# Patient Record
(demographics unavailable — no encounter records)

---

## 2024-10-17 NOTE — CHIEF COMPLAINT
[Follow Up Visit] : follow up visit [Patient] : patient [Mother] : mother [[employer] ] : provided by [employer]   [FreeTextEntry1] : [id number] [TWNoteComboBox1] : Vietnamese

## 2024-10-17 NOTE — ASSESSMENT
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 Thurston had noted that even endoscopy was challenging. Thus we opted to defer any catheterization while awake.   We discussed potential interventions -  She could have another vaginostomy using the pre-existing tract -- this could be done by Interventional Radiology -- prior to surgical reconstruction. The goal would be to drain hydrocolpos, to prevent painful drainage from cloacal opening. This would not, however, address the cause of the hydrocolpos.   Ultimately she will need pelvic reconstruction to separate the urethra, vagina, and rectum -- the details are specific to her anatomy and prior surgeries.  -Betsy has a long common channel and short urethra, so the likelihood that she could have urinary continence from below is very low, and she may need to continue with vesicostomy from above and have closure of bladder neck.  -The other question is in regards to vaginal reconstruction -- it is unlikely with this long CC that the vagina could be mobilized sufficiently to reach the perineum -- she would either need extensive abdominal surgery for mobilization, and/or a neovaginal graft.  In many patients with long CC, it may be best to delay vaginal reconstruction until they are older, in order to have more tissue to work with, to have tissue that is estrogenized, and when the patient is able to dilate.  In that case, we would keep the upper vagina attached to the cloacal common channel -- would need to make sure this opening is adequate for egress of hydrocolpos -- if not, would continue w/ vaginostomy tube and intermittent drainage through that opening  - In either case, the rectum would be  from the urethra and vagina, with goal of having BMs from below and reversing the colostomy.   Exam & counseling today were in conjunction with Dr. Thurston, and we made a plan to have a multidisciplinary discussion with Peds Urology as well to make a definite plan for Betsy's surgery.  No additional labs/imaging needed at this time.   TODAY 10/17/2024   Betsy is doing well after EUA -- no significant changes.  We reviewed the plan as discussed post-operatively

## 2024-10-17 NOTE — HISTORY OF PRESENT ILLNESS
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   Referred by:  RFP: VIANCA ROSE, TERRI WARNER  PCP: RICO LUCIA  Review of history: - born in [place] where she had a diverting colostomy, vesicostomy and vaginostomy drain (vaginostomy tube has since been removed and the site is closed); no appliances (ostomy bags) d/t reaction to product - established care with Dr. Thurston and Dr. VALERIE Santana in summer 2021  - has had pelvic MRI showing uterine duplication, fluid-filled genet-vaginas - normal spine MRI and cardiac echo  - 4/2022: EUA: single opening just below clitoris leading to 5 cm tortuous common channel with a < 1 cm urethra before bladder neck reached - 11/2022 pelvic US: two genet-vaginas with complex fluid (?debris)   First visit 06/21/2023: BETSY is in pre-K currently.  She urinates from vesicostomy and stools from colostomy, into diaper without appliances.  Mom shares that lately things are not going well, as there is "pus" coming out from below (from the cloacal opening). This has been going on for about 3 days. No urine coming from that opening, just pus coming out and it hurts her a lot when it happens. Happens maybe 3 times a day and has a terrible smell. No fevers. No pain in between episodes of purulent discharge.  Otherwise things are fine, no issues with voiding or BMs. No recent UTIs. Has never needed to take Miralax (old Rx on chart).   Follow-up 03/06/24: here today with mom  BETSY has started   Mom said that she had a bad odor coming from her vagina, mom thinks this has been going on for a while  Mom had taken her to the ED on 1/11/24 for urinary symptoms and pus coming from the vagina (cloacal opening) that had increased - 'They gave her an antibiotic due to a urinary infection' Started on Keflex and was told have pt f/u with Dr. Santana which she did on 3/1/24 Mom said BETSY still has pus coming out, which mom says hurts her  It occurs about 3 times/day  Mom reports no issues with BMs Currently taking Bactrim for ppx   TODAY 10/17/24: here today with mom  RN intake:  When asked why they were here today mom said she was here for a follow-up  I attempted to clarify - as no prior chart notes indicated a follow-up so soon  Mom said the discharge paperwork from the hospital advised she schedule a follow-up (after 2 weeks)   Mom said she has the pus coming from the vagina (cloacal opening) Thinks maybe it happens less frequently now (~1x per day maybe)  Mom said this is still painful for Betsy   No issues with voiding or BMs   They do not have an appt scheduled with Urology  nor any imaging appointments yet   We reviewed future plans
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
## 2024-11-01 NOTE — HISTORY OF PRESENT ILLNESS
[FreeTextEntry6] : Betsy is a 6-year-old female with a complex medical history, most notably an unrepaired cloacal malformation. She has undergone multiple surgeries, including a colostomy and vesicostomy, and requires ongoing multi-disciplinary care. Betsy is presenting today with dysuria and vaginal pain, concerning for a recurrent UTI. This visit follows a recent EUA and cystovaginoscopy on 10/1/24 performed by Dr. Anupama Lopez, which confirmed her complex anatomy and guided further surgical planning. She is currently awaiting additional reconstructive surgeries. Developmental delays, specifically with language and letter recognition, are also being monitored. Addressing the family's social determinants of health, including food insecurity and access to public benefits, remains a priority. Restarting UTI prophylaxis was addressed at her well visit on 9/13/24, but the family reports running out of Bactrim 22 days ago without notifying the team.   - Chief Complaint (CC) : Severe pain in the external vaginal region experienced during urination. Betsy has been experiencing vaginal pain for two days. The pain intensifies during urination, leading her to squat to alleviate it. She also displayed unusual behavior, such as sleeping in a squatting position. There's a previous history of urinary tract infections; however, the mother reports that previous methods for diagnosing this, such as the use of a catheter, have caused excessive pain.  - History of Present Illness : Betsy has been experiencing vaginal pain for two days. The pain intensifies during urination, leading her to squat to alleviate it. She also displayed unusual behavior, such as sleeping in a squatting position. There's a previous history of urinary tract infections; however, the mother reports that previous methods for diagnosing this, such as the use of a catheter, have caused excessive pain.  - Past Medical History : Betsy has had a previous diagnosis of a urinary tract infection.

## 2024-11-01 NOTE — PHYSICAL EXAM
[Acute Distress] : acute distress [Alert] : alert [Shai: ____] : Shai [unfilled] [Normal External Genitalia] : normal external genitalia [NL] : warm, clear [FreeTextEntry9] : urine emerging from left ostomy; beefy red ostomy on left side of abdomen with stool [FreeTextEntry6] : redness around urethra and clitoral clark

## 2024-11-01 NOTE — BEGINNING OF VISIT
[] :  [[employer] ] : provided by [employer]   [Time Spent: ____ minutes] : Total time spent using  services: [unfilled] minutes. The patient's primary language is not English thus required  services. [Mother] : mother [Interpreters_IDNumber] : [id number] [Interpreters_FullName] : Samm [TWNoteComboBox1] : Paraguayan

## 2024-11-01 NOTE — BEGINNING OF VISIT
[] :  [[employer] ] : provided by [employer]   [Time Spent: ____ minutes] : Total time spent using  services: [unfilled] minutes. The patient's primary language is not English thus required  services. [Mother] : mother [Interpreters_IDNumber] : [id number] [Interpreters_FullName] : Samm [TWNoteComboBox1] : Chilean

## 2024-12-03 NOTE — HISTORY OF PRESENT ILLNESS
[de-identified] : Painful urination  [FreeTextEntry6] : Betsy is a 6-year-old F coming in for acute visit for painful urination:   : Praveen Díaz #[id number]  Mom is noticing that she is complaining of pain when the pus is coming out Mom feels that there has been no improvement with the antibiotics She was having improvement with the antibiotics and then it just came back.  Mom feels that there pain started again once she stopped antibiotics.  Pus starts coming out about 5 to 6x per day.  No fevers. No abdominal pain or emesis.  No irritated skin that Mom notices.  She is currently on prophylaxis for UTi - daily 4.8mL daily

## 2025-01-15 NOTE — PHYSICAL EXAM
[NL] : grossly intact [TextBox_37] : Vesicostomy is draining clear urine, the double barrel stoma is pink and productive with formed stool, peristomal skin is intact without erythema, fluctuance, or induration  [TextBox_67] : Single perineal opening appreciated

## 2025-01-15 NOTE — HISTORY OF PRESENT ILLNESS
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 She currently has a vesicostomy and colostomy for urine and stool output respectively. She is known to have persistent hydrocolpos with episodic painful discharge from the cloacal opening. She underwent an EUA, cystoscopy, and vaginoscopy on 10/1/24 where she was found to have a long common channel and a short urethra. Since her last visit, Betsy has been doing well and mom notes she continues to have normal drainage from her vesicostomy and occasional drainage from her cloacal opening. No issues with the colostomy function noted either. Mom notes that they no longer are following with urology and would like to be connected to another physician.

## 2025-01-15 NOTE — HISTORY OF PRESENT ILLNESS
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 She currently has a vesicostomy and colostomy for urine and stool output respectively. She is known to have persistent hydrocolpos with episodic painful discharge from the cloacal opening. She underwent an EUA, cystoscopy, and vaginoscopy on 10/1/24 where she was found to have a long common channel and a short urethra. Since her last visit, Betsy has been doing well and mom notes she continues to have normal drainage from her vesicostomy and occasional drainage from her cloacal opening. No issues with the colostomy function noted either. Mom notes that they no longer are following with urology and would like to be connected to another physician.

## 2025-01-15 NOTE — CONSULT LETTER
[Dear  ___] : Dear  [unfilled], [Consult Letter:] : I had the pleasure of evaluating your patient, [unfilled]. [Please see my note below.] : Please see my note below. [Consult Closing:] : Thank you very much for allowing me to participate in the care of this patient.  If you have any questions, please do not hesitate to contact me. [Sincerely,] : Sincerely, [FreeTextEntry3] : Zachary Thurston MD FAAP FACS Director, [facility] Division of Pediatric General, Thoracic and Endoscopic Surgery [facility]

## 2025-01-15 NOTE — ASSESSMENT
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 She currently has a vesicostomy and colostomy for urine and stool output respectively. She is known to have persistent hydrocolpos with episodic painful discharge from the cloacal opening. She underwent an EUA, cystoscopy, and vaginoscopy on 10/1/24 where she was found to have a long common channel and a short urethra. She is doing well overall and has adequate drainage from the vesicostomy and a normal output from her colostomy at home. I counseled mom and reassured her that our focus is to devise an optimal treatment plan to allow Betsy to live comfortably with continence over her urination and bowel movements moving forward. I reviewed her anatomy with mom and carlos pictures illustrating the anatomy for mom's understanding. I then explained that the plan for surgery requires multiple surgery specialties to complete, but I discussed my GI portion of the surgery to target her bowel function and addressed the need to further understand the urological component to the surgery. Thus, I provided mom with the contact information required to schedule an appointment with Dr. Gitlin of the urology team for further evaluation. After their visit, I will connect with Dr. Gitlin to discuss Betsy's case and the plan for surgery further. Mom verbalized her understanding and agreed to follow up with urology. We will be in touch. Mom has my information and knows to contact me sooner with any questions or concerns.

## 2025-01-15 NOTE — REASON FOR VISIT
[Follow-up - Scheduled] : a follow-up, scheduled visit for [Other: ____] : [unfilled] [Patient] : patient [Mother] : mother [Interpreters_IDNumber] : [id number] [Interpreters_FullName] : Karel [TWNoteComboBox1] : [place]

## 2025-01-15 NOTE — REASON FOR VISIT
[Follow-up - Scheduled] : a follow-up, scheduled visit for [Other: ____] : [unfilled] [Patient] : patient [Mother] : mother [Interpreters_IDNumber] : [id number] [Interpreters_FullName] : Karel [TWNoteComboBox1] : Canadian

## 2025-01-15 NOTE — ADDENDUM
[FreeTextEntry1] : Documented by Zeus Mehta acting as a scribe for Dr. Thurston on 01/15/2025.   All medical record entries made by the Scribe were at my, Dr. Thurston, direction and personally dictated by me on 01/15/2025. I have reviewed the chart and agree that the record accurately reflects my personal performances of the history, physical exam, assessment and plan. I have also personally directed, reviewed, and agree with the instructions.

## 2025-01-15 NOTE — CONSULT LETTER
[Dear  ___] : Dear  [unfilled], [Consult Letter:] : I had the pleasure of evaluating your patient, [unfilled]. [Please see my note below.] : Please see my note below. [Consult Closing:] : Thank you very much for allowing me to participate in the care of this patient.  If you have any questions, please do not hesitate to contact me. [Sincerely,] : Sincerely, [FreeTextEntry3] : Zachary Thurston MD FAAP FACS Director, [facility] Division of Pediatric General, Thoracic and Endoscopic Surgery [facility]

## 2025-02-20 NOTE — DISCUSSION/SUMMARY
[FreeTextEntry1] : Betsy is a 7yo x/ hx of cloacal malformation s/p vesicostomy and colostomy as well as persistent hydrocolpos presenting with tactile fever, vomiting, and body ache at home. Physical exam reassuring given clear BS on auscultation as well as appropriate respiratory rate and effort. Patient does not appear dry on exam with moist mucus membranes and cap refill <2 seconds. Given myalgias, tactile fevers, and vomiting, will test for Flu with the Flu/COVID/RSV swab. Given her complex hx and CVA tenderness on exam, will rule out UTI by sending UCx  Plan - Will obtain Flu/COVID/RSV swab - Will obtain urine for UCx - gave strict ED precautions regarding dehydration or development of difficulty breathing

## 2025-02-20 NOTE — HISTORY OF PRESENT ILLNESS
[FreeTextEntry6] : Betsy is a 5yo x/ hx of cloacal malformation s/p vesicostomy and colostomy as well as persistent hydrocolpos presenting with tactile fever, vomiting, and body ache at home. Symptoms started yesterday when patient felt warm and starting vomiting with intake of solid foods. Symptoms continued today with x3 more episodes of NBNB emesis so family presented. Mom not recording temps at home, last tylenol at 6am. Patient is tolerating water with x3 voids today. Mom denies diarrhea, constipation, cough, or congestion. Mom does endorse patient is complaining of body aches. No sick contacts. VUTD including flu vaccine. She takes bactrim ppx qD to prevent UTIs

## 2025-02-20 NOTE — PHYSICAL EXAM
[Soft] : soft [NL] : warm, clear [Tender] : nontender [Distended] : nondistended [Normal Bowel Sounds] : abnormal bowel sounds [Hepatosplenomegaly] : no hepatosplenomegaly [FreeTextEntry9] : +CVA tenderness

## 2025-02-20 NOTE — REVIEW OF SYSTEMS
[Fever] : fever [Malaise] : malaise [Appetite Changes] : appetite changes [Vomiting] : vomiting [Myalgia] : myalgia [Negative] : Genitourinary [Chills] : no chills [Difficulty with Sleep] : no difficulty with sleep [Intolerance to feeds] : tolerance to feeds [Diarrhea] : no diarrhea [Constipation] : no constipation [Gaseous] : not gaseous [Abdominal Pain] : no abdominal pain [Restriction of Motion] : no restriction of motion [Bone Deformity] : no bone deformity [Swelling of Joint] : no swelling of joint [Redness of Joint] : no redness of joint

## 2025-03-10 NOTE — PHYSICAL EXAM
[NL] : grossly intact [TextBox_37] : Mild lower abdominal tenderness, foul-smelling urine from the vesicostomy, normal stool from the colostomy

## 2025-03-10 NOTE — HISTORY OF PRESENT ILLNESS
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 She currently has a vesicostomy and col[place]. She is known to have persistent hydrocolpos with episodic painful discharge from the cloacal opening. She underwent an EUA, cystoscopy and vaginoscopy on 10/1/24 where she was found to have a long common channel and a short urethra. Since her last visit, Mom trudi Meyer has developed N/V and abdominal pain over the last few days. Her appetite has decreased and she also has a decreased urinary output, which mom notes has been ongoing.

## 2025-03-10 NOTE — REASON FOR VISIT
[Follow-up - Scheduled] : a follow-up, scheduled visit for [Other: ____] : [unfilled] [Patient] : patient [Mother] : mother [Interpreters_IDNumber] : [id number]  [Interpreters_FullName] : Allison [TWNoteComboBox1] : [place] citizen

## 2025-03-10 NOTE — CONSULT LETTER
[Dear  ___] : Dear  [unfilled], [Consult Letter:] : I had the pleasure of evaluating your patient, [unfilled]. [Please see my note below.] : Please see my note below. [Consult Closing:] : Thank you very much for allowing me to participate in the care of this patient.  If you have any questions, please do not hesitate to contact me. [Sincerely,] : Sincerely, [FreeTextEntry3] : Zachary Thurston MD FAAP FACS Director, [facility] Division of Pediatric General, Thoracic and Endoscopic Surgery [facility]

## 2025-03-10 NOTE — HISTORY OF PRESENT ILLNESS
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 She currently has a vesicostomy and col[place]. She is known to have persistent hydrocolpos with episodic painful discharge from the cloacal opening. She underwent an EUA, cystoscopy and vaginoscopy on 10/1/24 where she was found to have a long common channel and a short urethra. Since her last visit, Mom trudi Meyer has developed N/V and abdominal pain over the last few days. Her appetite has decreased and she also has a decreased urinary output, which mom notes has been ongoing.

## 2025-03-10 NOTE — REASON FOR VISIT
[Follow-up - Scheduled] : a follow-up, scheduled visit for [Other: ____] : [unfilled] [Patient] : patient [Mother] : mother [Interpreters_IDNumber] : [id number]  [Interpreters_FullName] : Allison [TWNoteComboBox1] : Sen[place]alese

## 2025-03-10 NOTE — ADDENDUM
[FreeTextEntry1] : Documented by Zeus Mehta acting as a scribe for Dr. Thurston on 03/10/2025.   All medical record entries made by the Scribe were at my, Dr. Thurston, direction and personally dictated by me on 03/10/2025. I have reviewed the chart and agree that the record accurately reflects my personal performances of the history, physical exam, assessment and plan. I have also personally directed, reviewed, and agree with the instructions.

## 2025-03-10 NOTE — CONSULT LETTER
[Dear  ___] : Dear  [unfilled], [Consult Letter:] : I had the pleasure of evaluating your patient, [unfilled]. [Please see my note below.] : Please see my note below. [Consult Closing:] : Thank you very much for allowing me to participate in the care of this patient.  If you have any questions, please do not hesitate to contact me. [Sincerely,] : Sincerely, [FreeTextEntry3] : Zachary Thurston MD FAAP FACS Director, [facility] Division of Pediatric General, Thoracic and Endoscopic Surgery [facility]

## 2025-03-10 NOTE — ASSESSMENT
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 She currently has a vesicostomy and colostomy for urine and stool output respectively. Most recently, Betsy has developed concerning symptoms including a decreased appetite and urinary output, along with abdominal pain and N/V. I counseled mom and explained to her that given these symptoms, I recommend the family presents to the emergency room today to complete a formal workup of any possible UTI, virus, or other abnormality. Mom verbalized her understanding and agreed to present to the emergency room after our visit. Moving forward, I will continue to discuss Betsy's case with  to finalize a treatment plan to address her cloacal malformation. Mom has my information and knows to contact me sooner with any questions or concerns.

## 2025-03-10 NOTE — ASSESSMENT
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 She currently has a vesicostomy and colostomy for urine and stool output respectively. Most recently, Betsy has developed concerning symptoms including a decreased appetite and urinary output, along with abdominal pain and N/V. I counseled mom and explained to her that given these symptoms, I recommend the family presents to the emergency room today to complete a formal workup of any possible UTI, virus, or other abnormality. Mom verbalized her understanding and agreed to present to the emergency room after our visit. Moving forward, I will continue to discuss Betsy's case with  to finalize a treatment plan to address her cloacal malformation. Mom has my information and knows to contact me sooner with any questions or concerns.

## 2025-03-19 NOTE — PHYSICAL EXAM
[Alert] : alert [FROM] : full passive range of motion [Clear to Auscultation Bilaterally] : clear to auscultation bilaterally [Regular Rate and Rhythm] : regular rate and rhythm [Normal S1, S2 audible] : normal S1, S2 audible [Soft] : soft [Normal Bowel Sounds] : normal bowel sounds [Normal External Genitalia] : normal external genitalia [Moves All Extremities x 4] : moves all extremities x4 [Warm] : warm [Dry] : dry [Acute Distress] : no acute distress [Lethargic] : not lethargic [Tenderness] : no tenderness [Swelling] : no swelling [Wheezing] : no wheezing [Rales] : no rales [Crackles] : no crackles [Murmur] : no murmur [Tender] : nontender [Distended] : nondistended [FreeTextEntry9] : Ostomy in place pink and healthy tissue noted

## 2025-03-19 NOTE — HISTORY OF PRESENT ILLNESS
[de-identified] : Hospital Visit for gastroenteritis and suspected UTI [FreeTextEntry6] : Spoke to [facility] using  [facility] ID [id number]  Betsy is a 5 yo girl w/ PMH of cloacal malformation s/p colostomy and vesicostomy, recurrent UTI, uterine didelphys, VUR, L-sided hydronephrosis, distended vagina, and hydrocolpos p/w NBNB emesis and abd pain x3 days who had  NBNB emesis and abd pain x3 days found to have UA in the ER that was obtained via clean catch and straight cath, both of which showed turbid urine w/ positive leukocyte esterase. UCx grew >3 specims likely a contaminant but was continued on IV CTX until 3/12, and d/c with additional 3 days of Keflex to complete treatment course for UTI. Urology team consulted in the hospital who was not recommending any intervention at this time. Also consulted nephrology team iso high BPs in 120s/80s and concern for chronic kidney disease and recommended isradipine prn for BP >120/85 and required 1 prn during admission. She also had RBUS showing dilated L kidney with significant hydronephrosis.   Per [facility], Betsy is back to baseline from hospitalization and [facility] has no concerns at this time. She is eating and drinking well without any vomiting or diarrhea. She has no fevers, cough or congestion. Betsy denies any abdominal pain. [facility] reports she is seeing her urologist today at [facility] and will see the nephrologists next week. She finished her 3 days of Keflex and is back to taking her Bactrim daily.

## 2025-03-19 NOTE — DISCUSSION/SUMMARY
[FreeTextEntry1] : Betsy is a 5 yo girl w/ PMH of cloacal malformation s/p colostomy and vesicostomy, recurrent UTI, uterine didelphys, VUR, L-sided hydronephrosis, distended vagina, and hydrocolpos  who was admitted to the hospital for suspected UTI and found to be Rhino/entero and Coronavirus + started and ceftriaxone and received 3 days keflex whose course was complicated by elevated BP's and need for nephrology follow up is now doing well. Patient will see urology today at [facility] and has nephrology f/u 3/27. At this time will send new refill for bactrim and have MOC call with any other concerns.

## 2025-03-19 NOTE — DISCUSSION/SUMMARY
[FreeTextEntry1] : Betsy is a 7 yo girl w/ PMH of cloacal malformation s/p colostomy and vesicostomy, recurrent UTI, uterine didelphys, VUR, L-sided hydronephrosis, distended vagina, and hydrocolpos  who was admitted to the hospital for suspected UTI and found to be Rhino/entero and Coronavirus + started and ceftriaxone and received 3 days keflex whose course was complicated by elevated BP's and need for nephrology follow up is now doing well. Patient will see urology today at [facility] and has nephrology f/u 3/27. At this time will send new refill for bactrim and have MOC call with any other concerns.

## 2025-03-19 NOTE — REVIEW OF SYSTEMS
[Fever] : no fever [Malaise] : no malaise [Chest Pain] : no chest pain [Cough] : no cough [Shortness of Breath] : no shortness of breath [Appetite Changes] : no appetite changes [Intolerance to feeds] : tolerance to feeds [Vomiting] : no vomiting [Diarrhea] : no diarrhea

## 2025-03-19 NOTE — HISTORY OF PRESENT ILLNESS
[de-identified] : Hospital Visit for gastroenteritis and suspected UTI [FreeTextEntry6] : Spoke to [facility] using  [facility]eria ID [id number]  Betsy is a 5 yo girl w/ PMH of cloacal malformation s/p colostomy and vesicostomy, recurrent UTI, uterine didelphys, VUR, L-sided hydronephrosis, distended vagina, and hydrocolpos p/w NBNB emesis and abd pain x3 days who had  NBNB emesis and abd pain x3 days found to have UA in the ER that was obtained via clean catch and straight cath, both of which showed turbid urine w/ positive leukocyte esterase. UCx grew >3 specims likely a contaminant but was continued on IV CTX until 3/12, and d/c with additional 3 days of Keflex to complete treatment course for UTI. Urology team consulted in the hospital who was not recommending any intervention at this time. Also consulted nephrology team iso high BPs in 120s/80s and concern for chronic kidney disease and recommended isradipine prn for BP >120/85 and required 1 prn during admission. She also had RBUS showing dilated L kidney with significant hydronephrosis.   Per [facility], Betsy is back to baseline from hospitalization and [facility] has no concerns at this time. She is eating and drinking well without any vomiting or diarrhea. She has no fevers, cough or congestion. Betsy denies any abdominal pain. [facility] reports she is seeing her urologist today at [facility] and will see the nephrologists next week. She finished her 3 days of Keflex and is back to taking her Bactrim daily.

## 2025-03-31 NOTE — HISTORY OF PRESENT ILLNESS
[FreeTextEntry1] : Betsy is a 7yo female with a history of cloacal malformation with a colostomy and vesicostomy in place. 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 On her most recent EUA, she was found to have a long common channel and a short urethra. She has had recurrent episodes of urinary tract infections.

## 2025-03-31 NOTE — CONSULT LETTER
[Dear  ___] : Dear  [unfilled], [Consult Letter:] : I had the pleasure of evaluating your patient, [unfilled]. [Please see my note below.] : Please see my note below. [Consult Closing:] : Thank you very much for allowing me to participate in the care of this patient.  If you have any questions, please do not hesitate to contact me. [Sincerely,] : Sincerely, [FreeTextEntry3] : Zachary Thurston MD FAAP FACS Director, [facility] Division of Pediatric General, Thoracic and Endoscopic Surgery [facility]

## 2025-04-09 NOTE — CONSULT LETTER
[FreeTextEntry3] : Zachary Thurston MD FAAP FACS Director, [facility] Division of Pediatric General, Thoracic and Endoscopic Surgery [facility]

## 2025-04-09 NOTE — ADDENDUM
[FreeTextEntry1] : Documented by Zeus Mehta acting as a scribe for Dr. Thurston on 04/09/2025.   All medical record entries made by the Scribe were at my, Dr. Thurston, direction and personally dictated by me on 04/09/2025. I have reviewed the chart and agree that the record accurately reflects my personal performances of the history, physical exam, assessment and plan. I have also personally directed, reviewed, and agree with the instructions.

## 2025-04-09 NOTE — HISTORY OF PRESENT ILLNESS
[FreeTextEntry1] : Betsy is a 7yo female with a history of cloacal malformation with a colostomy and vesicostomy in place. 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 On her most recent EUA, she was found to have a long common channel and a short urethra. She has had recurrent episodes of urinary tract infections. Most recently, Betsy continues to have pain at the vesicostomy site when urinating. Mom notes she has been treating with Noy-LAX at home, which has improved the output from her colostomy, but after discontinuing the Noy-LAX yesterday, her stool has become harder. Mom has not been keeping the appliance over the stoma site as she feels it is too difficult to maintain with the urinary output from her vesicostomy. Betsy is eating well without emesis. No fevers or changes in energy levels noted. To note, mom has not yet been able to schedule an appointment with Dr. Gitlin of the Urology team.

## 2025-04-09 NOTE — CONSULT LETTER
[FreeTextEntry3] : Zachary Thurston MD FAAP FACS Director, [facility] Division of Pediatric General, Thoracic and Endoscopic Surgery [facility]

## 2025-04-09 NOTE — ASSESSMENT
[FreeTextEntry1] : Betsy is a 5yo female with a history of cloacal malformation with a colostomy and vesicostomy in place. She continues to experience pain at the vesicostomy when urinating and her colostomy output improved with the Noy-LAX treatment, but after discontinuing yesterday, the stool has become harder. I counseled mom and began by explaining to her that one way to maintain the vesicostomy and colostomy sites would be to add an appliance to both areas to control the leakage and output of stool. However, I recommended we move forward with the plan to complete a cystovaginoscopy in the OR in accordance with Dr. Gitlin to evaluate her anatomy in order to formulate a plan for reconstructive surgery in the near future. I will also prescribe a course of Keflex for the family to remain compliant with over the next few weeks. Lastly, I advised mom to remain compliant with a capful of Noy-LAX daily to aid with the consistency of her stool output. Mom demonstrated appropriate understanding and is in agreement with this plan. We will facilitate a date for the OR in the near future and I will reach out to mom with an update. She has my information and knows to contact me sooner with any questions or concerns.

## 2025-04-09 NOTE — ASSESSMENT
[FreeTextEntry1] : Betsy is a 7yo female with a history of cloacal malformation with a colostomy and vesicostomy in place. She continues to experience pain at the vesicostomy when urinating and her colostomy output improved with the Noy-LAX treatment, but after discontinuing yesterday, the stool has become harder. I counseled mom and began by explaining to her that one way to maintain the vesicostomy and colostomy sites would be to add an appliance to both areas to control the leakage and output of stool. However, I recommended we move forward with the plan to complete a cystovaginoscopy in the OR in accordance with Dr. Gitlin to evaluate her anatomy in order to formulate a plan for reconstructive surgery in the near future. I will also prescribe a course of Keflex for the family to remain compliant with over the next few weeks. Lastly, I advised mom to remain compliant with a capful of Noy-LAX daily to aid with the consistency of her stool output. Mom demonstrated appropriate understanding and is in agreement with this plan. We will facilitate a date for the OR in the near future and I will reach out to mom with an update. She has my information and knows to contact me sooner with any questions or concerns.

## 2025-04-11 NOTE — HISTORY OF PRESENT ILLNESS
[de-identified] : Hospital Follow Up  [FreeTextEntry6] : Patient is a 6 year old female with hx of cloacal malformation s/p colostomy and vesicostomy as well as uterine didelphys, L hydronephrosis, and VUR with recurrent UTIs presenting for hospital follow up. Patient presented to the ED on 3/21 with fevers and flank pain, was found to have pyelonephritis with a urine culture that grew >100,000 e. coli. Patient was treated with ceftriaxone then transitioned to PO kelfex (which infection was sensitive to in vitro). During admission patient was also found to have large stool burden on CT abd/pelvis and patient was brought to OR with surgical team for fecal disimpaction. She was discharged on keflex and miralax which she has been taking. Having regular, soft stool output. She finished keflex course yesterday and resumed home bactrim UTI ppx today. Patient and parent endorse that since this hospitalization patient has continued to have perineal pain and scant dark-colored, foul-smelling discharge from the perineal opening. No pain surrounding colostomy or vesicostomy site. No other abdominal or back pain. No fevers. Tolerating PO.

## 2025-04-11 NOTE — BEGINNING OF VISIT
[Patient] : patient [] :  [Language Line ] : provided by Language Line   [Mother] : mother [Interpreters_IDNumber] : [id number]

## 2025-04-11 NOTE — END OF VISIT
[] : Resident [FreeTextEntry3] : 7 y/o F with cloacal malformation with colostomy and vesicostomy here for HFU. Admitted 3/22-3/28 with fevers and flank pain. Had E. Coli UTI Went to the OR for fecal disimpaction. Since 3/28 was d/c'ed home on miralax and to complete cephalexin course. Has had normal soft stool output. Since d/c has not had further fevers. Reports pain at the perineal opening. Unable to collect urine from vesicostomy at the time of visit. Has f/u with Surgery today. Reviewed urine culture and sensitivies from the hospital, showing E. Coli resistant to Bactrim. Will need to discuss with GI regarding changing Bactrim prophylaxis.

## 2025-04-11 NOTE — DISCUSSION/SUMMARY
[FreeTextEntry1] : 6 year old female with hx of cloacal malformation s/p vesicostomy and colostomy with single perineal opening connecting to urethra, vaginal canals (2), and rectum (per review of exam under anesthesia notes) presenting for hospital follow up. While fevers and flank pain resolved following inpatient/home keflex treatment, patient continues to have discomfort at the perineal opening with associated foul-smelling discharge. On exam, no overlying skin changes or obvious signs of infection. MOC comfortable with obtaining catheterized sample through vesicostomy opening which we will send for urinalysis and culture. Patient to see Dr. Thurston of generally surgery this afternoon, will discuss further evaluation of perineal discomfort with that team. Outside of current evaluation of possibly persistent UTI, patient will require alternative UTI ppx given the e coli found during this admission was not sensitive to bactrim. Will discuss this/ongoing care plan with urology.   - UA/Ucx unable to be collected today - supplies sent home with MOC  - Care coordination with surgical team for further evaluation of perineal discomfort - Appt with Dr. Thurston 1 PM today  - Coordination with urologic team for ongoing antibiotic prophylaxis

## 2025-04-11 NOTE — REVIEW OF SYSTEMS
[Negative] : Heme/Lymph [Fever] : no fever [Intolerance to feeds] : tolerance to feeds [Abdominal Pain] : no abdominal pain

## 2025-04-11 NOTE — HISTORY OF PRESENT ILLNESS
[de-identified] : Hospital Follow Up  [FreeTextEntry6] : Patient is a 6 year old female with hx of cloacal malformation s/p colostomy and vesicostomy as well as uterine didelphys, L hydronephrosis, and VUR with recurrent UTIs presenting for hospital follow up. Patient presented to the ED on 3/21 with fevers and flank pain, was found to have pyelonephritis with a urine culture that grew >100,000 e. coli. Patient was treated with ceftriaxone then transitioned to PO kelfex (which infection was sensitive to in vitro). During admission patient was also found to have large stool burden on CT abd/pelvis and patient was brought to OR with surgical team for fecal disimpaction. She was discharged on keflex and miralax which she has been taking. Having regular, soft stool output. She finished keflex course yesterday and resumed home bactrim UTI ppx today. Patient and parent endorse that since this hospitalization patient has continued to have perineal pain and scant dark-colored, foul-smelling discharge from the perineal opening. No pain surrounding colostomy or vesicostomy site. No other abdominal or back pain. No fevers. Tolerating PO.

## 2025-04-11 NOTE — BEGINNING OF VISIT
[Patient] : patient [] :  [Language Line ] : provided by Language Line   [Mother] : mother [Interpreters_IDNumber] : [id number]

## 2025-04-11 NOTE — END OF VISIT
[] : Resident [FreeTextEntry3] : 5 y/o F with cloacal malformation with colostomy and vesicostomy here for HFU. Admitted 3/22-3/28 with fevers and flank pain. Had E. Coli UTI Went to the OR for fecal disimpaction. Since 3/28 was d/c'ed home on miralax and to complete cephalexin course. Has had normal soft stool output. Since d/c has not had further fevers. Reports pain at the perineal opening. Unable to collect urine from vesicostomy at the time of visit. Has f/u with Surgery today. Reviewed urine culture and sensitivies from the hospital, showing E. Coli resistant to Bactrim. Will need to discuss with GI regarding changing Bactrim prophylaxis.

## 2025-04-11 NOTE — PHYSICAL EXAM
[Alert] : alert [EOMI] : grossly EOMI [Pink Nasal Mucosa] : pink nasal mucosa [Supple] : supple [FROM] : full passive range of motion [Clear to Auscultation Bilaterally] : clear to auscultation bilaterally [Regular Rate and Rhythm] : regular rate and rhythm [Normal S1, S2 audible] : normal S1, S2 audible [Soft] : soft [No Abnormal Lymph Nodes Palpated] : no abnormal lymph nodes palpated [Moves All Extremities x 4] : moves all extremities x4 [Warm, Well Perfused x4] : warm, well perfused x4 [Warm] : warm [Clear] : clear [Dry] : dry [Acute Distress] : no acute distress [Toxic] : not toxic [Traumatic] : atraumatic [Erythematous Oropharynx] : nonerythematous oropharynx [Tender] : nontender [Distended] : nondistended [FreeTextEntry9] : Vesicostomy and colostomy opening directly into diaper. No erythema or irritation surrounding opening sites. No CVA tenderness.  [FreeTextEntry6] : Single, small perineal opening inferior to the clitoris. Scant discharge appreciated. TTP in the region of the opening. No overlying skin changes.

## 2025-04-24 NOTE — HISTORY OF PRESENT ILLNESS
[FreeTextEntry6] : abdominal pain subsided completed levaquin, unsure if keflex is needed to continue has been afebrile    Hospital Course: Discharge Date 15-Apr-2025 Admission Date 10-Apr-2025 22:42 Reason for Admission Abdominal pain and constipation Hospital Course BETSY ESPARZA is a 6y8m Female who was admitted to [facility] for abdominal pain  Betsy is a 7 yo girl w/ PMH of cloacal malformation s/p colostomy and vesicostomy, recurrent UTI, uterine didelphys, VUR, L-sided hydronephrosis, uterine didelphys distended vagina, and hydrocolpos presents to the ER with complaints of left flank pain and fevers x1d. She was seen in the clinic 1d prior to admission was instructed to take miralax daily and initiated on keflex. Per mother she has been taking the Keflex since but she had worsening pain and fevers. She denies emesis, her ostomy continues to have output, vesicostomy continues to drain urine. She was recently admitted on 03/22/2025 for similar complaints and required an examination under anesthesia and fecal disimpaction on 03/26/2025. In ER, Labs show a white count of 9.5k, urine 2-5 wbcs, no nitrites and few bacteria X ray abdomen shows Large R colonic stool burden and non obstructive gas pattern She was admitted to pediatric surgery for bowel regimen and workup. On 4/11, urology was consulted for UTI workup, sent a sterile culture from her vesicostomy. Culture significant for multiple organisms including E. coli, pseudomonas, enterococcus. ID was consulted and recommended 1 week of levaquin for treatment. She drank golytely until her stool output via stoma improved.   At time of discharge, pt was tolerating a regular diet, voiding/stooling independently, ambulating, and pain was well-controlled. Patient and family felt ready for discharge.  Med Reconciliation: Medication Reconciliation Status Admission Reconciliation is Not Complete Discharge Reconciliation is Completed lovofloxacin starting keflex today per [facility]  Discharge Medications acetaminophen 160 mg/5 mL oral suspension: 7.5 milliliter(s) orally every 6 hours as needed for mild pain Keflex 250 mg/5 mL oral liquid: 6 milliliter(s) orally once a day Starting 4/22 levoFLOXacin 25 mg/mL oral solution: 8 milliliter(s) orally once a day through 4/21 polyethylene glycol 3350 oral powder for reconstitution: 17 gram(s) orally 2 times a day  Care Plan/Procedures: Discharge Diagnoses, Assessment and Plan of Treatment PRINCIPAL DISCHARGE DIAGNOSIS Diagnosis: Acute UTI (urinary tract infection) Assessment and Plan of Treatment: Goal(s) To get better and follow your care plan as instructed. Follow Up: Care Providers for Follow up (PCP/Outpatient Provider) [facility] [address] [place] [address], [address] Entrance [address] [place] [id number] Phone: [phone] Fax: [phone] Follow Up Time: 2 weeks Additional Provider Info (For SysAdmin Use Only) PROVIDER:[TOKEN:[[id number]],FOLLOWUP:[2 weeks]] Care Providers Direct Addresses (For SYSAdmin Use Only) ,[email or web address] NPI number (For SysAdmin Use Only) : [[id number]] Patient's Scheduled Appointments Bridger Hickman [facility] PED[facility] [address] [place] [address] Scheduled Appointment: 04/22/2025  Flaquita Marie [facility] PEDUROLOGY [address] [place] [address] Scheduled Appointment: 06/02/2025 Additional Scheduled Appointments APPTS ARE READY TO BE MADE: [X ] YES  Additional Information about above appointments (if needed): [X] Can be seen by an ACP on a day that their surgeon is in clinic  Type of Appt: [ ] RPA [X] HFU [ ] POA  Best Family or Patient Contact (if needed): Discharge Diet Regular Diet - No restrictions Activity Follow Instructions Provided by your Surgical Team Additional Instructions PAIN: You may continue to take Acetaminophen (Tylenol) and Ibuprofen (Advil, Motrin) over the counter for pain or fever as needed. ACTIVITY: Return to normal activity level as tolerated. NOTIFY YOUR PEDIATRICIAN FOR: Any fever (over 100.5 F) or his/her pain is not controlled on their discharge pain medications, any new or worsening non-emergency symptoms. RETURN TO ED: If any change in mental status (drowsy, non-responsive), persistent vomiting ANTIBIOTICS: Please complete your course of antibiotics as prescribed. Please call our office if you notice new or worsening diarrhea, or inability to tolerate the oral medications. Quality Measures: Does the patient have difficulty climbing stairs? No Cognition: The patient has No difficulties Patient Condition Stable Hospice Patient No Core Measure Site No Document Complete: Care Provider Seen in Hospital [facility] Phys[facility] Section Complete This document is complete and the patient is ready for discharge. For questions about your prescriptions, please call: [phone] Is this contact telephone number correct? Yes Attending Attestation Statement I have personally seen and examined the patient. I have collaborated with and supervised the . on the discharge service for the patient. I have reviewed and made amendments to the documentation where necessary.   Electronic Signatures: Nida Garcia) (Signed 15-Apr-2025 11:07) Co-Signer: Hospital Course, Med Reconciliation, Care Plan/Procedures, Follow Up, Quality Measures, Document Complete Annika Woods) (Signed 15-Apr-2025 15:27) Authored: Hospital Course, Med Reconciliation, Care Plan/Procedures, Follow Up, Quality Measures, Document Complete Nell Sow) (Signed 16-Apr-2025 16:07) Authored: Med Reconciliation, Follow Up, Quality Measures   Last Updated: 16-Apr-2025 16:07 by Nell Sow)

## 2025-04-24 NOTE — HISTORY OF PRESENT ILLNESS
[FreeTextEntry6] : abdominal pain subsided completed levaquin, unsure if keflex is needed to continue has been afebrile    Hospital Course: Discharge Date 15-Apr-2025 Admission Date 10-Apr-2025 22:42 Reason for Admission Abdominal pain and constipation Hospital Course BETSY ESPARZA is a 6y8m Female who was admitted to [facility] for abdominal pain  Betsy is a 7 yo girl w/ PMH of cloacal malformation s/p colostomy and vesicostomy, recurrent UTI, uterine didelphys, VUR, L-sided hydronephrosis, uterine didelphys distended vagina, and hydrocolpos presents to the ER with complaints of left flank pain and fevers x1d. She was seen in the clinic 1d prior to admission was instructed to take miralax daily and initiated on keflex. Per mother she has been taking the Keflex since but she had worsening pain and fevers. She denies emesis, her ostomy continues to have output, vesicostomy continues to drain urine. She was recently admitted on 03/22/2025 for similar complaints and required an examination under anesthesia and fecal disimpaction on 03/26/2025. In ER, Labs show a white count of 9.5k, urine 2-5 wbcs, no nitrites and few bacteria X ray abdomen shows Large R colonic stool burden and non obstructive gas pattern She was admitted to pediatric surgery for bowel regimen and workup. On 4/11, urology was consulted for UTI workup, sent a sterile culture from her vesicostomy. Culture significant for multiple organisms including E. coli, pseudomonas, enterococcus. ID was consulted and recommended 1 week of levaquin for treatment. She drank golytely until her stool output via stoma improved.   At time of discharge, pt was tolerating a regular diet, voiding/stooling independently, ambulating, and pain was well-controlled. Patient and family felt ready for discharge.  Med Reconciliation: Medication Reconciliation Status Admission Reconciliation is Not Complete Discharge Reconciliation is Completed lovofloxacin starting keflex today per [facility]  Discharge Medications acetaminophen 160 mg/5 mL oral suspension: 7.5 milliliter(s) orally every 6 hours as needed for mild pain Keflex 250 mg/5 mL oral liquid: 6 milliliter(s) orally once a day Starting 4/22 levoFLOXacin 25 mg/mL oral solution: 8 milliliter(s) orally once a day through 4/21 polyethylene glycol 3350 oral powder for reconstitution: 17 gram(s) orally 2 times a day  Care Plan/Procedures: Discharge Diagnoses, Assessment and Plan of Treatment PRINCIPAL DISCHARGE DIAGNOSIS Diagnosis: Acute UTI (urinary tract infection) Assessment and Plan of Treatment: Goal(s) To get better and follow your care plan as instructed. Follow Up: Care Providers for Follow up (PCP/Outpatient Provider) [facility] [address] [place] [address], [address] Entrance [address] [place] [id number] Phone: [phone] Fax: [phone] Follow Up Time: 2 weeks Additional Provider Info (For SysAdmin Use Only) PROVIDER:[TOKEN:[[id number]],FOLLOWUP:[2 weeks]] Care Providers Direct Addresses (For SYSAdmin Use Only) ,[email or web address] NPI number (For SysAdmin Use Only) : [[id number]] Patient's Scheduled Appointments Bridger Hickman [facility] PEDNE[facility] [address] [place] [address] Scheduled Appointment: 04/22/2025  Flaquita Marie [facility] PEDUROLOGY [address] [place] [address] Scheduled Appointment: 06/02/2025 Additional Scheduled Appointments APPTS ARE READY TO BE MADE: [X ] YES  Additional Information about above appointments (if needed): [X] Can be seen by an ACP on a day that their surgeon is in clinic  Type of Appt: [ ] RPA [X] HFU [ ] POA  Best Family or Patient Contact (if needed): Discharge Diet Regular Diet - No restrictions Activity Follow Instructions Provided by your Surgical Team Additional Instructions PAIN: You may continue to take Acetaminophen (Tylenol) and Ibuprofen (Advil, Motrin) over the counter for pain or fever as needed. ACTIVITY: Return to normal activity level as tolerated. NOTIFY YOUR PEDIATRICIAN FOR: Any fever (over 100.5 F) or his/her pain is not controlled on their discharge pain medications, any new or worsening non-emergency symptoms. RETURN TO ED: If any change in mental status (drowsy, non-responsive), persistent vomiting ANTIBIOTICS: Please complete your course of antibiotics as prescribed. Please call our office if you notice new or worsening diarrhea, or inability to tolerate the oral medications. Quality Measures: Does the patient have difficulty climbing stairs? No Cognition: The patient has No difficulties Patient Condition Stable Hospice Patient No Core Measure Site No Document Complete: Care Provider Seen in Hospital [facility] Thurston Physician Section Complete This document is complete and the patient is ready for discharge. For questions about your prescriptions, please call: [phone] Is this contact telephone number correct? Yes Attending Attestation Statement I have personally seen and examined the patient. I have collaborated with and supervised the . on the discharge service for the patient. I have reviewed and made amendments to the documentation where necessary.   Electronic Signatures: Nida Garcia) (Signed 15-Apr-2025 11:07) Co-Signer: Hospital Course, Med Reconciliation, Care Plan/Procedures, Follow Up, Quality Measures, Document Complete Annika Woods) (Signed 15-Apr-2025 15:27) Authored: Hospital Course, Med Reconciliation, Care Plan/Procedures, Follow Up, Quality Measures, Document Complete Nell Sow) (Signed 16-Apr-2025 16:07) Authored: Med Reconciliation, Follow Up, Quality Measures   Last Updated: 16-Apr-2025 16:07 by Nell Sow)

## 2025-04-24 NOTE — DISCUSSION/SUMMARY
[FreeTextEntry1] : 6 year old female with hx of cloacal malformation s/p vesicostomy and colostomy with single perineal opening connecting to urethra, vaginal canals (2), and rectum (per review of exam under anesthesia notes) presenting for hospital follow up.  Discussed discharge instructions, [facility] refill sent due to MOC reports the bottle she had at home states to discard after 15 days ED precautions discussed Surgery follow scheduled in 2 weeks RTC for WCC/PRN

## 2025-04-24 NOTE — DISCUSSION/SUMMARY
[FreeTextEntry1] : 6 year old female with hx of cloacal malformation s/p vesicostomy and colostomy with single perineal opening connecting to urethra, vaginal canals (2), and rectum (per review of exam under anesthesia notes) presenting for hospital follow up.  Discussed discharge instructions, [facility], refill sent due to MOC reports the bottle she had at home states to discard after 15 days ED precautions discussed Surgery follow scheduled in 2 weeks RTC for WCC/PRN

## 2025-04-24 NOTE — BEGINNING OF VISIT
[Mother] : mother [] :  [Language Line ] : provided by Language Line   [Interpreters_IDNumber] : [id number] [Interpreters_FullName] : Loco

## 2025-04-24 NOTE — BEGINNING OF VISIT
[Mother] : mother [] :  [Language Line ] : provided by [employer]   [Interpreters_IDNumber] : [id number] [Interpreters_FullName] : Loco

## 2025-04-24 NOTE — PHYSICAL EXAM
[NL] : warm, clear [Alert] : alert [FreeTextEntry9] : No erythema or irritation noted to vesicotomy opening

## 2025-04-25 NOTE — CONSULT LETTER
[Dear  ___] : Dear  [unfilled], [Consult Letter:] : I had the pleasure of evaluating your patient, [unfilled]. [Please see my note below.] : Please see my note below. [Consult Closing:] : Thank you very much for allowing me to participate in the care of this patient.  If you have any questions, please do not hesitate to contact me. [Sincerely,] : Sincerely, [FreeTextEntry3] :  Bridger Hickman MD PGY4 Nena Rosenthal MD, [phone]

## 2025-04-25 NOTE — DATA REVIEWED
[FreeTextEntry1] : Comprehensive Metabolic Panel (04.10.25 @ 18:21)  Sodium: 137 mmol/L Potassium: 4.3 mmol/L Chloride: 100 mmol/L Carbon Dioxide: 21 mmol/L Anion Gap: 16 mmol/L Blood Urea Nitrogen: 12 mg/dL Creatinine: 0.31 mg/dL Glucose: 97 mg/dL Calcium: 9.7 mg/dL Protein Total: 7.8 g/dL Albumin: 3.9 g/dL Bilirubin Total: <0.2 mg/dL Alkaline Phosphatase: 169 U/L Aspartate Aminotransferase (AST/SGOT): 14 U/L Alanine Aminotransferase (ALT/SGPT): 9 U/L

## 2025-04-25 NOTE — REASON FOR VISIT
[Initial Evaluation] : an initial evaluation of [Hypertension] : ~T hypertension [Congenital Kidney Problems] : congenital kidney problems [Mother] : mother [Interpreters_IDNumber] : [id number]

## 2025-04-25 NOTE — REASON FOR VISIT
[Initial Evaluation] : an initial evaluation of [Hypertension] : ~T hypertension [Congenital Kidney Problems] : congenital kidney problems [Mother] : mother [Interpreters_IDNumber] : [id number]

## 2025-04-25 NOTE — PHYSICAL EXAM
[Well Developed] : well developed [Normal] : alert, oriented as age-appropriate, affect appropriate; no weakness, no facial asymmetry, moves all extremities normal gait- child older than 18 months [Mass] : no abdominal mass  [Tenderness] : no tenderness [Distention] : no distention [de-identified] : Skinny [de-identified] : Colostomy in the left lower abdomen and vesicostomy is draining on the suprapubic area

## 2025-04-25 NOTE — REVIEW OF SYSTEMS
[Negative] : Genitourinary [FreeTextEntry7] : Normal colostomy output [FreeTextEntry8] : Urine draining from vesicostomy

## 2025-04-25 NOTE — PHYSICAL EXAM
[Well Developed] : well developed [Normal] : alert, oriented as age-appropriate, affect appropriate; no weakness, no facial asymmetry, moves all extremities normal gait- child older than 18 months [Mass] : no abdominal mass  [Tenderness] : no tenderness [Distention] : no distention [de-identified] : Skinny [de-identified] : Colostomy in the left lower abdomen and vesicostomy is draining on the suprapubic area

## 2025-04-25 NOTE — CONSULT LETTER
[Dear  ___] : Dear  [unfilled], [Consult Letter:] : I had the pleasure of evaluating your patient, [unfilled]. [Please see my note below.] : Please see my note below. [Consult Closing:] : Thank you very much for allowing me to participate in the care of this patient.  If you have any questions, please do not hesitate to contact me. [Sincerely,] : Sincerely, [FreeTextEntry3] :  Bridger Hickman MD PGY4 Nena Rosenthal MD, [phone]

## 2025-05-15 NOTE — BEGINNING OF VISIT
[] :  [Language Line ] : provided by Language Line   [Interpreters_IDNumber] : [id number] [TWNoteComboBox1] : Citizen of [place]

## 2025-05-15 NOTE — PHYSICAL EXAM
[Clear Rhinorrhea] : clear rhinorrhea [NL] : soft, nontender, nondistended, normal bowel sounds, no hepatosplenomegaly [Wheezing] : no wheezing [Rales] : no rales [Tachypnea] : no tachypnea [Subcostal Retractions] : no subcostal retractions [Suprasternal Retractions] : no suprasternal retractions [FreeTextEntry1] : comfortable [FreeTextEntry4] : mild congestion

## 2025-05-15 NOTE — HISTORY OF PRESENT ILLNESS
[de-identified] : cold symptoms [FreeTextEntry6] : has some cold symptoms for past few days runny nose congested cough no fever sick for 3 days no vomiting or diarrhea eating well sleeping well no other problems

## 2025-05-21 NOTE — END OF VISIT
[Time Spent: ___ minutes] : I have spent [unfilled] minutes of time on the encounter which excludes teaching and separately reported services. PHYSICAL EXAM:  Vital Signs Last 24 Hrs  T(C): 36.8 (08-08-23 @ 20:59)  T(F): 98.2 (08-08-23 @ 20:59), Max: 98.9 (08-08-23 @ 14:07)  HR: 94 (08-08-23 @ 20:59) (94 - 99)  BP: 140/88 (08-08-23 @ 20:59)  BP(mean): --  RR: 17 (08-08-23 @ 20:59) (16 - 17)  SpO2: 100% (08-08-23 @ 20:59) (99% - 100%)  Wt(kg): --    Constitutional: NAD, awake and alert, well developed  EYES: EOMI, conjunctiva clear  ENT:  Normal Hearing, no tonsillar exudates, dry lips  Neck: Soft and supple , no thyromegaly   Respiratory: Breath sounds are clear bilaterally, No wheezing, rales or rhonchi, no tachypnea, no accessory muscle use  Cardiovascular: S1 and S2, regular rate and rhythm, no Murmurs, gallops or rubs, no JVD, no leg edema  Gastrointestinal: Bowel Sounds present, soft, nontender, nondistended, no guarding, no rebound  Extremities: No cyanosis or clubbing; warm to touch  Neurological: No focal deficits, CN II-XII intact bilaterally, sensation to light touch intact in all extremities. Pupils are equally reactive to light and symmetrical in size. All extremities AG >5 seconds without drift  Musculoskeletal: 5/5 strength b/l upper and lower extremities; no joint swelling.   Skin: No rashes, no ulcerations

## 2025-05-23 NOTE — CONSULT LETTER
[Dear  ___] : Dear  [unfilled], [Consult Letter:] : I had the pleasure of evaluating your patient, [unfilled]. [Please see my note below.] : Please see my note below. [Consult Closing:] : Thank you very much for allowing me to participate in the care of this patient.  If you have any questions, please do not hesitate to contact me. [Sincerely,] : Sincerely, [FreeTextEntry3] : Zachary Thurston MD FAAP FACS Director, [facility] Division of Pediatric General, Thoracic and Endoscopic Surgery [facility]

## 2025-05-23 NOTE — ADDENDUM
[FreeTextEntry1] : Documented by Zeus Mehta acting as a scribe for Dr. Thurston on 05/21/2025.   All medical record entries made by the Scribe were at my, Dr. Thurston, direction and personally dictated by me on 05/21/2025. I have reviewed the chart and agree that the record accurately reflects my personal performances of the history, physical exam, assessment and plan. I have also personally directed, reviewed, and agree with the instructions.

## 2025-05-23 NOTE — PHYSICAL EXAM
[NL] : grossly intact [TextBox_37] : stomas in place, vesicostomy in place, skin intact [TextBox_67] : deferred [TextBox_59] : deferred

## 2025-05-23 NOTE — ASSESSMENT
[FreeTextEntry1] : Betsy is a 5yo female with a history of a cloacal malformation with a double barrel colostomy and vesicostomy in place. 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 She is currently scheduled to undergo an EUA and cystovaginoscopy along with Dr. Gitlin on May 29, 2025. Currently, Betsy has been doing well with the Noy-LAX daily and her stool is becoming more formed. I counseled mom and reviewed the plan to move forward with the cystovaginoscopy next week. I discussed the procedure and what this entails. She understands the imaging will help guide the route for surgery in the future and she is in agreement to proceed. She has my information and knows to contact me with any questions or concerns. Counselled mom she should give both caps of MiraLAX at the same time.  Try to time so the colostomy empties when she is home so they can change it.  She can titrate the MiraLAX as needed to keep the stool soft and passable. We redressed the stoma in the office.  I will set her up with a new DME cure med rx, last DME sending limited supplies and very difficult to work with. Mom in agreement w the plan.

## 2025-05-23 NOTE — REASON FOR VISIT
[Follow-up - Scheduled] : a follow-up, scheduled visit for [Other: ____] : [unfilled] [Patient] : patient [Parents] : parents [Interpreters_IDNumber] : [id number] [Interpreters_FullName] : Gianluca [TWNoteComboBox1] : [place]ian

## 2025-05-23 NOTE — HISTORY OF PRESENT ILLNESS
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 On her most recent EUA, she was found to have a long common channel and a short urethra. She has had recurrent episodes of UTIs. She has been managed with MiraLAX to keep the stool output from her ostomy soft. She is currently scheduled to undergo an EUA and cystovaginoscopy along with Dr. Gitlin on May 29, 2025. Currently, she is being treated with 1 cap of Noy-LAX in the morning and again at night. Mom notes Betsy is stooling 2x daily via colostomy stool. She feels the stool is soft, but is slowly getting more formed. Mom was using ostomy bag but ran out of supplies. She is currently on PPX antibiotics 1x day. Stopped yesterday per Dr Gitlin due to positive urine cx now on Augmentin for 10 days. She is also having URI symptoms but no fever for the past week.

## 2025-05-23 NOTE — REASON FOR VISIT
[Follow-up - Scheduled] : a follow-up, scheduled visit for [Other: ____] : [unfilled] [Patient] : patient [Parents] : parents [Interpreters_IDNumber] : [id number] [Interpreters_FullName] : Gianluca [TWNoteComboBox1] : Indian

## 2025-05-23 NOTE — HISTORY OF PRESENT ILLNESS
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 On her most recent EUA, she was found to have a long common channel and a short urethra. She has had recurrent episodes of UTIs. She has been managed with MiraLAX to keep the stool output from her ostomy soft. She is currently scheduled to undergo an EUA and cystovaginoscopy along with Dr. Gitlin on May 29, 2025. Currently, she is being treated with 1 cap of Noy-LAX in the morning and again at night. Mom notes Betsy is stooling 2x daily via colostomy stool. She feels the stool is soft, but is slowly getting more formed. Mom was using ostomy bag but ran out of supplies. She is currently on PPX antibiotics 1x day. Stopped yesterday per Dr Gitlin due to positive urine cx now on Augmentin for 10 days. She is also having URI symptoms but no fever for the past week.

## 2025-05-23 NOTE — ASSESSMENT
[FreeTextEntry1] : Betsy is a 7yo female with a history of a cloacal malformation with a double barrel colostomy and vesicostomy in place. 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 She is currently scheduled to undergo an EUA and cystovaginoscopy along with Dr. Gitlin on May 29, 2025. Currently, Betsy has been doing well with the Noy-LAX daily and her stool is becoming more formed. I counseled mom and reviewed the plan to move forward with the cystovaginoscopy next week. I discussed the procedure and what this entails. She understands the imaging will help guide the route for surgery in the future and she is in agreement to proceed. She has my information and knows to contact me with any questions or concerns. Counselled mom she should give both caps of MiraLAX at the same time.  Try to time so the colostomy empties when she is home so they can change it.  She can titrate the MiraLAX as needed to keep the stool soft and passable. We redressed the stoma in the office.  I will set her up with a new DME cure med rx, last DME sending limited supplies and very difficult to work with. Mom in agreement w the plan.

## 2025-05-23 NOTE — REASON FOR VISIT
[Follow-up - Scheduled] : a follow-up, scheduled visit for [Other: ____] : [unfilled] [Patient] : patient [Parents] : parents [Interpreters_IDNumber] : [id number] [Interpreters_FullName] : Gianluca [TWNoteComboBox1] : Indian

## 2025-05-23 NOTE — CONSULT LETTER
[Dear  ___] : Dear  [unfilled], [Consult Letter:] : I had the pleasure of evaluating your patient, [unfilled]. [Please see my note below.] : Please see my note below. [Consult Closing:] : Thank you very much for allowing me to participate in the care of this patient.  If you have any questions, please do not hesitate to contact me. [Sincerely,] : Sincerely, [FreeTextEntry3] : Zachary Thurston MD FAAP FACS Director, [facility] Division of Pediatric General, Thoracic and Endoscopic Surgery [facility]

## 2025-05-23 NOTE — CONSULT LETTER
[Dear  ___] : Dear  [unfilled], [Consult Letter:] : I had the pleasure of evaluating your patient, [unfilled]. [Please see my note below.] : Please see my note below. [Consult Closing:] : Thank you very much for allowing me to participate in the care of this patient.  If you have any questions, please do not hesitate to contact me. [Sincerely,] : Sincerely, [FreeTextEntry3] : Zachary Thurston MD FAAP FACS Director, [facility] Division of Pediatric General, Thoracic and Endoscopic Surgery [facility]

## 2025-07-17 NOTE — ASSESSMENT
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 She is doing well overall, but mom has recently begun appreciating urinary output from her colostomy site over the last two weeks. I counseled mom and offered my reassurance. I explained to her that the opening where she appreciates the urinary output from is connected to the bladder and reassured her that having urinary output from this site is not atypical nor does this warrant any urgent concerns. I reviewed the possibility that that the vesicostomy is blocked, which leads to the urinary output at the ostomy site. My recommendation at this time is to complete an AXR to evaluate for any underlying stool burden that may be contributing to her urinary output from the ostomy site. Mom understands and agrees with this plan. We will follow up after the imaging to review the results and discuss next steps.

## 2025-07-17 NOTE — ADDENDUM
[FreeTextEntry1] : Documented by Zeus Mehta acting as a scribe for Dr. Pablo on 07/17/2025.   All medical record entries made by the Scribe were at my, Dr. Pablo, direction and personally dictated by me on 07/17/2025. I have reviewed the chart and agree that the record accurately reflects my personal performances of the history, physical exam, assessment and plan. I have also personally directed, reviewed, and agree with the instructions.

## 2025-07-17 NOTE — HISTORY OF PRESENT ILLNESS
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 On her most recent EUA, she was found to have a long common channel and a short urethra. She has had recurrent episodes of UTIs. Her stool output has been managed with MiraLAX after a few episodes of significant constipation, which has improved overall. Mom denies any recent UTIs, but has raised concerns of urinary output from the stoma site.

## 2025-07-17 NOTE — CONSULT LETTER
[Dear  ___] : Dear  [unfilled], [Consult Letter:] : I had the pleasure of evaluating your patient, [unfilled]. [Please see my note below.] : Please see my note below. [Consult Closing:] : Thank you very much for allowing me to participate in the care of this patient.  If you have any questions, please do not hesitate to contact me. [Sincerely,] : Sincerely, [FreeTextEntry3] : Festus Pablo MD Division of Pediatric, General, Thoracic and Endoscopic Surgery [facility]

## 2025-07-17 NOTE — CONSULT LETTER
[Dear  ___] : Dear  [unfilled], [Consult Letter:] : I had the pleasure of evaluating your patient, [unfilled]. [Please see my note below.] : Please see my note below. [Consult Closing:] : Thank you very much for allowing me to participate in the care of this patient.  If you have any questions, please do not hesitate to contact me. [Sincerely,] : Sincerely, [FreeTextEntry3] : Festus Pablo MD Division of Pediatric, General, Thoracic and Endoscopic Surgery [facility]

## 2025-07-17 NOTE — CONSULT LETTER
[Dear  ___] : Dear  [unfilled], [Consult Letter:] : I had the pleasure of evaluating your patient, [unfilled]. [Please see my note below.] : Please see my note below. [Sincerely,] : Sincerely, [FreeTextEntry3] :  Bridger Hickman MD PGY5 Nena Rosenthal MD, [phone]

## 2025-07-17 NOTE — REASON FOR VISIT
[Follow-up - Scheduled] : a follow-up, scheduled visit for [Other: ____] : [unfilled] [Patient] : patient [Mother] : mother [Interpreters_IDNumber] : [id number] [Interpreters_FullName] : Mike [TWNoteComboBox1] : Cuban

## 2025-07-17 NOTE — CONSULT LETTER
[Dear  ___] : Dear  [unfilled], [Consult Letter:] : I had the pleasure of evaluating your patient, [unfilled]. [Please see my note below.] : Please see my note below. [Sincerely,] : Sincerely, [FreeTextEntry3] :  Bridger Hickman MD PGY5 Nena Rosenthal MD, [phone]

## 2025-07-17 NOTE — CONSULT LETTER
[Dear  ___] : Dear  [unfilled], [Consult Letter:] : I had the pleasure of evaluating your patient, [unfilled]. [Please see my note below.] : Please see my note below. [Consult Closing:] : Thank you very much for allowing me to participate in the care of this patient.  If you have any questions, please do not hesitate to contact me. [Sincerely,] : Sincerely, [FreeTextEntry3] : Festus Pablo MD Division of Pediatric, General, Thoracic and Endoscopic Surgery [facility]

## 2025-07-17 NOTE — PHYSICAL EXAM
[Well Developed] : well developed [Normal] : alert, oriented as age-appropriate, affect appropriate; no weakness, no facial asymmetry, moves all extremities normal gait- child older than 18 months [Mass] : no abdominal mass  [Tenderness] : no tenderness [Distention] : no distention [de-identified] : Skinny [de-identified] : Colostomy in the left lower abdomen and vesicostomy is draining on the suprapubic area

## 2025-07-17 NOTE — PHYSICAL EXAM
[Well Developed] : well developed [Normal] : alert, oriented as age-appropriate, affect appropriate; no weakness, no facial asymmetry, moves all extremities normal gait- child older than 18 months [Mass] : no abdominal mass  [Tenderness] : no tenderness [Distention] : no distention [de-identified] : Skinny [de-identified] : Colostomy in the left lower abdomen and vesicostomy is draining on the suprapubic area

## 2025-07-17 NOTE — HISTORY OF PRESENT ILLNESS
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 On her most recent EUA, she was found to have a long common channel and a short urethra. She has had recurrent episodes of UTIs. Her stool output has been managed with MiraLAX after a few episodes of significant constipation, which has improved overall. Mom denies any recent UTIs, but has raised concerns of urinary output from the stoma site.

## 2025-07-17 NOTE — REASON FOR VISIT
[Initial Evaluation] : an initial evaluation of [Hypertension] : ~T hypertension [Congenital Kidney Problems] : congenital kidney problems [Mother] : mother [Patient] : patient [Medical Records] : medical records [Interpreters_IDNumber] : [id number]

## 2025-07-17 NOTE — REASON FOR VISIT
[Initial Evaluation] : an initial evaluation of [Hypertension] : ~T hypertension [Congenital Kidney Problems] : congenital kidney problems [Mother] : mother [Patient] : patient [Medical Records] : medical records [Interpreters_IDNumber] : [id number]

## 2025-07-17 NOTE — CONSULT LETTER
[Dear  ___] : Dear  [unfilled], [Consult Letter:] : I had the pleasure of evaluating your patient, [unfilled]. [Please see my note below.] : Please see my note below. [Consult Closing:] : Thank you very much for allowing me to participate in the care of this patient.  If you have any questions, please do not hesitate to contact me. [Sincerely,] : Sincerely, [FreeTextEntry3] : Festus Pablo MD Division of Pediatric, General, Thoracic and Endoscopic Surgery [facility]

## 2025-07-17 NOTE — REASON FOR VISIT
[Follow-up - Scheduled] : a follow-up, scheduled visit for [Other: ____] : [unfilled] [Patient] : patient [Mother] : mother [Interpreters_IDNumber] : [id number] [Interpreters_FullName] : Mike [TWNoteComboBox1] : Kuwaiti

## 2025-07-17 NOTE — REASON FOR VISIT
[Follow-up - Scheduled] : a follow-up, scheduled visit for [Other: ____] : [unfilled] [Patient] : patient [Mother] : mother [Interpreters_IDNumber] : [id number] [Interpreters_FullName] : Mike [TWNoteComboBox1] : Hungarian

## 2025-07-17 NOTE — REASON FOR VISIT
[Follow-up - Scheduled] : a follow-up, scheduled visit for [Other: ____] : [unfilled] [Patient] : patient [Mother] : mother [Interpreters_IDNumber] : [id number] [Interpreters_FullName] : Mike [TWNoteComboBox1] : Vincentian